# Patient Record
Sex: FEMALE | Race: WHITE | NOT HISPANIC OR LATINO | Employment: FULL TIME | URBAN - METROPOLITAN AREA
[De-identification: names, ages, dates, MRNs, and addresses within clinical notes are randomized per-mention and may not be internally consistent; named-entity substitution may affect disease eponyms.]

---

## 2024-06-26 ENCOUNTER — APPOINTMENT (OUTPATIENT)
Dept: CT IMAGING | Facility: CLINIC | Age: 29
End: 2024-06-26
Payer: COMMERCIAL

## 2024-06-26 ENCOUNTER — HOSPITAL ENCOUNTER (EMERGENCY)
Facility: CLINIC | Age: 29
Discharge: HOME | End: 2024-06-26
Attending: SOCIAL WORKER | Admitting: SOCIAL WORKER
Payer: COMMERCIAL

## 2024-06-26 VITALS
DIASTOLIC BLOOD PRESSURE: 84 MMHG | HEART RATE: 72 BPM | RESPIRATION RATE: 18 BRPM | OXYGEN SATURATION: 99 % | SYSTOLIC BLOOD PRESSURE: 123 MMHG | TEMPERATURE: 98.7 F

## 2024-06-26 DIAGNOSIS — K61.0 PERIANAL ABSCESS: ICD-10-CM

## 2024-06-26 DIAGNOSIS — K60.40 RECTAL FISTULA: ICD-10-CM

## 2024-06-26 LAB
ANION GAP SERPL CALCULATED.3IONS-SCNC: 11 MMOL/L (ref 7–15)
BASOPHILS # BLD AUTO: 0 10E3/UL (ref 0–0.2)
BASOPHILS NFR BLD AUTO: 0 %
BUN SERPL-MCNC: 9.2 MG/DL (ref 6–20)
CALCIUM SERPL-MCNC: 9 MG/DL (ref 8.6–10)
CHLORIDE SERPL-SCNC: 104 MMOL/L (ref 98–107)
CREAT SERPL-MCNC: 0.77 MG/DL (ref 0.51–0.95)
DEPRECATED HCO3 PLAS-SCNC: 23 MMOL/L (ref 22–29)
EGFRCR SERPLBLD CKD-EPI 2021: >90 ML/MIN/1.73M2
EOSINOPHIL # BLD AUTO: 0.1 10E3/UL (ref 0–0.7)
EOSINOPHIL NFR BLD AUTO: 1 %
ERYTHROCYTE [DISTWIDTH] IN BLOOD BY AUTOMATED COUNT: 13.8 % (ref 10–15)
GLUCOSE SERPL-MCNC: 84 MG/DL (ref 70–99)
HCG SERPL QL: NEGATIVE
HCT VFR BLD AUTO: 37.9 % (ref 35–47)
HGB BLD-MCNC: 12.6 G/DL (ref 11.7–15.7)
IMM GRANULOCYTES # BLD: 0 10E3/UL
IMM GRANULOCYTES NFR BLD: 0 %
LYMPHOCYTES # BLD AUTO: 2.8 10E3/UL (ref 0.8–5.3)
LYMPHOCYTES NFR BLD AUTO: 37 %
MCH RBC QN AUTO: 28.6 PG (ref 26.5–33)
MCHC RBC AUTO-ENTMCNC: 33.2 G/DL (ref 31.5–36.5)
MCV RBC AUTO: 86 FL (ref 78–100)
MONOCYTES # BLD AUTO: 0.5 10E3/UL (ref 0–1.3)
MONOCYTES NFR BLD AUTO: 6 %
NEUTROPHILS # BLD AUTO: 4.1 10E3/UL (ref 1.6–8.3)
NEUTROPHILS NFR BLD AUTO: 55 %
NRBC # BLD AUTO: 0 10E3/UL
NRBC BLD AUTO-RTO: 0 /100
PLATELET # BLD AUTO: 213 10E3/UL (ref 150–450)
POTASSIUM SERPL-SCNC: 4.1 MMOL/L (ref 3.4–5.3)
RBC # BLD AUTO: 4.4 10E6/UL (ref 3.8–5.2)
SODIUM SERPL-SCNC: 138 MMOL/L (ref 135–145)
WBC # BLD AUTO: 7.5 10E3/UL (ref 4–11)

## 2024-06-26 PROCEDURE — 99285 EMERGENCY DEPT VISIT HI MDM: CPT | Mod: 25

## 2024-06-26 PROCEDURE — 36415 COLL VENOUS BLD VENIPUNCTURE: CPT | Performed by: SOCIAL WORKER

## 2024-06-26 PROCEDURE — 80048 BASIC METABOLIC PNL TOTAL CA: CPT | Performed by: SOCIAL WORKER

## 2024-06-26 PROCEDURE — 84703 CHORIONIC GONADOTROPIN ASSAY: CPT | Performed by: SOCIAL WORKER

## 2024-06-26 PROCEDURE — 85025 COMPLETE CBC W/AUTO DIFF WBC: CPT | Performed by: SOCIAL WORKER

## 2024-06-26 PROCEDURE — 72193 CT PELVIS W/DYE: CPT

## 2024-06-26 PROCEDURE — 250N000011 HC RX IP 250 OP 636: Performed by: SOCIAL WORKER

## 2024-06-26 RX ORDER — IOPAMIDOL 755 MG/ML
500 INJECTION, SOLUTION INTRAVASCULAR ONCE
Status: COMPLETED | OUTPATIENT
Start: 2024-06-26 | End: 2024-06-26

## 2024-06-26 RX ADMIN — IOPAMIDOL 75 ML: 755 INJECTION, SOLUTION INTRAVENOUS at 19:06

## 2024-06-26 ASSESSMENT — COLUMBIA-SUICIDE SEVERITY RATING SCALE - C-SSRS
1. IN THE PAST MONTH, HAVE YOU WISHED YOU WERE DEAD OR WISHED YOU COULD GO TO SLEEP AND NOT WAKE UP?: NO
6. HAVE YOU EVER DONE ANYTHING, STARTED TO DO ANYTHING, OR PREPARED TO DO ANYTHING TO END YOUR LIFE?: NO
2. HAVE YOU ACTUALLY HAD ANY THOUGHTS OF KILLING YOURSELF IN THE PAST MONTH?: NO

## 2024-06-26 ASSESSMENT — ACTIVITIES OF DAILY LIVING (ADL)
ADLS_ACUITY_SCORE: 35

## 2024-06-26 NOTE — ED PROVIDER NOTES
Emergency Department Note      History of Present Illness     Chief Complaint   Wound Infection      HPI   Lit Maldonado is a 29 year old female who presents to the emergency department today for evaluation of abscess.  The patient has had a chronic wound on her left buttock which has been present for 1 year now.  Occasionally she has been lancing it herself. She was evaluated by general surgery and had this excised in May.  At that time they were unsure if it was a cyst versus hidradenitis. The cyst has since reformed multiple times since this excision.  Today the patient notes that the cyst was getting larger therefore she took an X-Acto knife and sterilized it with a lighter and lanced the cyst. More purulent drainage was expressed than normal therefore she presented to urgent care.  Denies fever, chills.  Reports her pain has improved since she lanced the cyst.  Denies history of ulcerative colitis or Crohn's disease.  Grandmother had ulcerative colitis.    Independent Historian   None    Review of External Notes   General surgery visit 5/2/2024: Excision of cyst performed, suspected hidradenitis.    Past Medical History     Medical History and Problem List   No past medical history on file.    Medications   CONCERTA OR  TRAZODONE HCL  ZOLOFT OR        Surgical History   No past surgical history on file.    Physical Exam     Patient Vitals for the past 24 hrs:   BP Temp Temp src Pulse Resp SpO2   06/26/24 2014 -- -- -- -- -- 100 %   06/26/24 2013 108/76 -- -- 81 -- 100 %   06/26/24 1918 111/70 -- -- 74 -- 100 %   06/26/24 1755 127/81 98.7  F (37.1  C) Oral 84 18 99 %     Physical Exam  General: Awake, alert, non-toxic.  Head:  Scalp is atraumatic.   Eyes:  Conjunctiva normal, PERRL  ENT:  The external nose and ears are normal.     Oropharynx clear, uvula midline.  Neck:  Normal range of motion without rigidity.  CV:  Regular rate and rhythm    No pathologic murmur, rubs, or gallops.  Resp:  Breath sounds are  clear bilaterally    Non-labored, no retractions or accessory muscle use  Abdomen: Abdomen is soft, no distension, no tenderness, no masses.     GOLDIE: Performed in the presence of a female chaperone.  Erythema and on the left medial aspect of the buttocks.  Scant purulence present. No significant induration.   MS:  No lower extremity edema/swelling. No midline cervical, thoracic, or lumbar tenderness.  Extremities without joint swelling or redness.  Skin:  Warm and dry, No rash or lesions noted.  Neuro:  Alert and oriented.  GCS 15 Moves all extremities normal.  No facial asymmetry. Gait normal.  Psych: Awake. Alert. Normal affect. Appropriate interactions.    Diagnostics     Lab Results   Labs Ordered and Resulted from Time of ED Arrival to Time of ED Departure   BASIC METABOLIC PANEL - Normal       Result Value    Sodium 138      Potassium 4.1      Chloride 104      Carbon Dioxide (CO2) 23      Anion Gap 11      Urea Nitrogen 9.2      Creatinine 0.77      GFR Estimate >90      Calcium 9.0      Glucose 84     HCG QUALITATIVE PREGNANCY - Normal    hCG Serum Qualitative Negative     CBC WITH PLATELETS AND DIFFERENTIAL    WBC Count 7.5      RBC Count 4.40      Hemoglobin 12.6      Hematocrit 37.9      MCV 86      MCH 28.6      MCHC 33.2      RDW 13.8      Platelet Count 213      % Neutrophils 55      % Lymphocytes 37      % Monocytes 6      % Eosinophils 1      % Basophils 0      % Immature Granulocytes 0      NRBCs per 100 WBC 0      Absolute Neutrophils 4.1      Absolute Lymphocytes 2.8      Absolute Monocytes 0.5      Absolute Eosinophils 0.1      Absolute Basophils 0.0      Absolute Immature Granulocytes 0.0      Absolute NRBCs 0.0         Imaging   CT Pelvis Soft Tissue w Contrast   Final Result   IMPRESSION:   1.  Left-sided transsphincteric perianal fistula with an associated 6 mm abscess versus phlegmon along the left side of the gluteal cleft.           Independent Interpretation   None    ED Course       Medications Administered   Medications   sodium chloride (PF) 0.9% PF flush 100 mL (65 mLs Intravenous $Given 6/26/24 1906)   iopamidol (ISOVUE-370) solution 500 mL (75 mLs Intravenous $Given 6/26/24 1906)       Procedures   Procedures     Discussion of Management   Colorectal surgery, Dr. Payne. She recommended sitz baths. Plan on outpatient colorectal surgery referral for further management of perianal fistula.     Social Determinants of Health adding to complexity of care   None    ED Course        Medical Decision Making / Diagnosis     CMS Diagnoses: None    MIPS       None    MDM   Lit Maldonado is a 29 year old female who presented to the ED for evaluation of perirectal abscess for 1 year. See HPI for further details. Differential diagnosis include but not limited to perianal abscess, rectal fistula, hidradenitis, abscess, among others. On exam the patient is afebrile and hemodynamically stable. Low clinical suspicion for sepsis or serious bacterial infection. There is erythema and scant purulent discharge on the medial aspect of the left buttocks. No induration or palpable area of fluid collection. Laboratory workup unremarkable, no leukocytosis.  CT scan revealed a transsphincteric perianal fistula with an associated 6 mm abscess versus phlegmon.  These findings were discussed with colorectal surgery who recommended outpatient follow-up for further management of perianal fistula, did not feel that antibiotics were needed at this time as it is currently draining. Sitz baths recommended. Discussed this with the patient and her mother over the phone.  They expressed understanding and were agreeable to the plan of outpatient colorectal surgery follow-up.  Return precautions discussed including worsening pain, fever, nausea and vomiting. Patient and mother expressed understanding. All questions were answered. Patient is discharged home in stable condition.     Disposition   The patient was discharged.      Diagnosis     ICD-10-CM    1. Perianal abscess  K61.0       2. Rectal fistula  K60.4 Adult Colorectal Surgery [facility] Referral         SHIV Pulido Alexandra, PA-C  06/26/24 2109

## 2024-06-26 NOTE — ED TRIAGE NOTES
Pt arrives ambulatory by self from urgent care for rectal abscess. Has had abscess for around 1 year, has surgery in May, but continues to drain and had it drained two hours ago at . Reports being sent over for possible surgical intervention and provider was worried it is connected to the rectum. Denies pain at the moment since it was drained but feels tenderness. Not on oral antibiotics. Takes concerta, paxil, and trazodone per pt. Has not eaten since 11am and was just a couple bites of apple.

## 2024-06-26 NOTE — ED PROVIDER NOTES
ED ATTENDING PHYSICIAN NOTE:   I evaluated this patient in conjunction with Christy Peguero PA-C  I have participated in the care of the patient and personally performed key elements of the history, exam, and medical decision making.      HPI:   Lit Maldonado is a 29 year old female who presents for evaluation of a wound.  Patient reports that she has had a rectal abscess for 1 year. She states that she underwent an excision around 2 months ago in May but immediately upon the completion of her post-op healing the abscess began filling again. She reports that twice since then she has attempted to use a tool at home to drain the abscess, most recently today she used an Xacto knife today to drain the area before presenting to Urgent Care with concerns about the volume of pus that drained from the wound. Patient denies experiencing any fever, nausea, vomiting, diarrhea, or urinary problems.      Independent Historian:   None    Review of External Notes:   I reviewed the office visit from earlier today, urgent care sent over to the ER  I reviewed the neurosurgery visit from 5/2/2024: For cyst at the left gluteal cleft and excision was performed in the office with packing placed     EXAM:   General: Overall stable and nontoxic appearing   HEENT: Conjunctivae clear, no scleral icterus, mucous membranes moist  Neuro: Alert, moving all extremities equally with intention  CV: Regular rate and rhythm, radial and DP pulses equal  Respiratory: No signs of respiratory distress, lungs clear to auscultation bilaterally   Abdomen: Soft, without rigidity or rebound throughout  Rectal exam performed with ED tech as chaperone: Drainage around the rectal area, no bleeding, area of fluctuance and tenderness at approximately 4:00 with a small draining tract  MSK: No lower extremity swelling or tenderness    Independent Interpretation (X-rays, CTs, rhythm strip):  None    Consultations/Discussion of Management or Tests:  CRC  surgery:  Dr Payne     Social Determinants of Health affecting care:   Stress/Adjustment Disorders     MEDICAL DECISION MAKING/ASSESSMENT AND PLAN:   29-year-old female with a history of recurrent perirectal abscess who presented to the emergency department with a wound concern after she self drained the abscess at home.  CT abdomen pelvis here demonstrated a left transsphincteric fistula with associated 6mm phlegmon vs abscess.  Area appears to be draining.  We discussed with colorectal surgery, who recommended deferring antibiotics or further incision and they will see patient in clinic shortly.  No systemic signs of infection.  Findings were discussed with patient and return precautions given.  Discharged in stable condition.     DIAGNOSIS:     ICD-10-CM    1. Perianal abscess  K61.0       2. Rectal fistula  K60.4 Adult Colorectal Surgery  Referral               DISPOSITION:   The patient was discharged.     Scribe Disclosure:  Annel GOTTLIEB, am serving as a scribe at 6:27 PM on 6/26/2024 to document services personally performed by Esthela Hernández MD based on my observations and the provider's statements to me.   6/26/2024  [facility] EMERGENCY DEPT       Esthela Hernández MD  06/27/24 4485

## 2024-06-27 ENCOUNTER — OFFICE VISIT (OUTPATIENT)
Dept: SURGERY | Facility: CLINIC | Age: 29
End: 2024-06-27
Payer: COMMERCIAL

## 2024-06-27 ENCOUNTER — TELEPHONE (OUTPATIENT)
Dept: SURGERY | Facility: CLINIC | Age: 29
End: 2024-06-27

## 2024-06-27 VITALS
OXYGEN SATURATION: 100 % | TEMPERATURE: 98.1 F | SYSTOLIC BLOOD PRESSURE: 109 MMHG | DIASTOLIC BLOOD PRESSURE: 77 MMHG | HEART RATE: 84 BPM | WEIGHT: 171.7 LBS

## 2024-06-27 DIAGNOSIS — K60.30 ANAL FISTULA: Primary | ICD-10-CM

## 2024-06-27 DIAGNOSIS — K61.0 PERIANAL ABSCESS: ICD-10-CM

## 2024-06-27 PROCEDURE — 99203 OFFICE O/P NEW LOW 30 MIN: CPT | Performed by: NURSE PRACTITIONER

## 2024-06-27 RX ORDER — CLINDAMYCIN PHOSPHATE 10 MG/G
GEL TOPICAL 2 TIMES DAILY
COMMUNITY
Start: 2023-12-14

## 2024-06-27 RX ORDER — PAROXETINE 30 MG/1
1 TABLET, FILM COATED ORAL EVERY EVENING
COMMUNITY
Start: 2023-12-14

## 2024-06-27 RX ORDER — OXYCODONE HYDROCHLORIDE 5 MG/1
1 TABLET ORAL EVERY 4 HOURS PRN
COMMUNITY
Start: 2024-05-02 | End: 2024-07-22

## 2024-06-27 RX ORDER — POLYETHYLENE GLYCOL 3350 17 G/17G
17 POWDER, FOR SOLUTION ORAL DAILY
COMMUNITY
Start: 2022-10-15

## 2024-06-27 ASSESSMENT — PAIN SCALES - GENERAL: PAINLEVEL: MODERATE PAIN (5)

## 2024-06-27 NOTE — TELEPHONE ENCOUNTER
"Patient presented to  then the ED yesterday for rectal pain.  \"Patient reports that she has had a rectal abscess for 1 year. She states that she underwent an excision around 2 months ago in May but immediately upon the completion of her post-op healing the abscess began filling again. She reports that twice since then she has attempted to use a tool at home to drain the abscess, most recently today she used an Xacto knife today to drain the area before presenting to Urgent Care with concerns about the volume of pus that drained from the wound. Patient denies experiencing any fever, nausea, vomiting, diarrhea, or urinary problem     CT 6/26/24  IMPRESSION:  1.  Left-sided transsphincteric perianal fistula with an associated 6 mm abscess versus phlegmon along the left side of the gluteal cleft.    Scheduled same day visit with Jennifer Mark NP due to amount of pus and pain. Patient said she has been self draining her abscess with a knife.     "

## 2024-06-27 NOTE — DISCHARGE INSTRUCTIONS
You were found to have an perirectal fistula today.  Please do sitz bath's 3 times a day.  Someone from colorectal surgery will call you to schedule an appointment.  Please return to the emergency department if you develop a fever, worsening pain, nausea, vomiting.

## 2024-06-27 NOTE — PROGRESS NOTES
Colon and Rectal Surgery Consult Clinic Note    Date: 2024     Referring provider:  No referring provider defined for this encounter.     RE: Lit Maldonado  : 1995  PEPE: 2024    Lit Maldonado is a very pleasant 29 year old female here for perianal abscess.    HPI:  A year ago thought she had cystic acne on her bottom. Had a sore that drained. This kept filling up and draining. Was treated with a topical cream. Had excisional debridement in May of this year of a left buttock cyst by a surgeon in [facility]. Wound was left open. Yesterday it was really swollen and had a lot of purulent drainage. Went to urgent care and was diagnosed with an anal fistula. She has drained it a few times by poking it with an X-Acto knife at home that she sterilizes with a flame. She denies any diarrhea but does get constipated with her anxiety medications.  Family history of grandmother with ulcerative colitis and colon cancer.    CT 24  IMPRESSION:  1.  Left-sided transsphincteric perianal fistula with an associated 6 mm abscess versus phlegmon along the left side of the gluteal cleft.    Physical Examination:  /77 (BP Location: Left arm, Patient Position: Sitting, Cuff Size: Adult Regular)   Pulse 84   Temp 98.1  F (36.7  C) (Oral)   Wt 171 lb 11.2 oz   LMP 2024 (Exact Date)   SpO2 100%   General: alert, oriented, in no acute distress, sitting comfortably  HEENT: mucous membranes moist    Perianal external examination: Exam was chaperoned by Marina Diego MA   External opening in the left anterior position with a drop of purulent drainage present. Tract of induration toward the anus that can be gently probed.     Digital rectal examination: Was performed.   Sphincter tone: Good.  Palpable lesions: No.  Other: None.  Bimanual examination: was not performed    Anoscopy: Was performed.   Hemorrhoids: No significant internal hemorrhoids.  Lesions: No obvious internal fistula  opening    Assessment/Plan: 29 year old female with recurrent perianal abscess for the past year with likely anal fistula. We had a long discussion regarding treatment including possible fistulotomy and possible seton placement depending on muscle involvement. CT shows a transsphincteric fistula but did discuss that imaging is now always completely accurate as to muscle involvement. Discussed that if a seton is placed that they could need further surgical intervention and if no tract is able to be found at the time of surgery that we may need to get further workup and imaging. Discussed the risks of developing an abscess and asked them to return to clinic if they develop any worsening symptoms. They stated an understanding of this and wishes to proceed with surgical intervention. Asked her not to drain it at home if it recurs but to come in to clinic and we will do our best to get her in within a day or two if needed. Patient's questions were answered to her stated satisfaction and she is in agreement with this plan.     Medical history:  No past medical history on file.    Surgical history:  No past surgical history on file.    Problem list:  There are no problems to display for this patient.      Medications:  Current Outpatient Medications   Medication Sig Dispense Refill    clindamycin (CLEOCIN-T) 1 % external gel Apply topically 2 times daily      CONCERTA OR None Entered      oxyCODONE (ROXICODONE) 5 MG tablet Take 1 tablet by mouth every 4 hours as needed      PARoxetine (PAXIL) 30 MG tablet Take 1 tablet by mouth daily      polyethylene glycol (MIRALAX) 17 g packet Take 17 g by mouth daily      TRAZODONE HCL None Entered      ZOLOFT OR None Entered         Allergies:  Allergies   Allergen Reactions    Sulfa Antibiotics Hives       Family history:  No family history on file.    Social history:  Social History     Tobacco Use    Smoking status: Passive Smoke Exposure - Never Smoker    Smokeless tobacco: Not on  file    Tobacco comments:     mom smokes   Substance Use Topics    Alcohol use: Not on file    Marital status: single.    Nursing Notes:   Marina Diego  6/27/2024  3:10 PM  Signed  Chief Complaint   Patient presents with    Abscess     Abscess/fistula complex       Vitals:    06/27/24 1509   BP: 109/77   BP Location: Left arm   Patient Position: Sitting   Cuff Size: Adult Regular   Pulse: 84   Temp: 98.1  F (36.7  C)   TempSrc: Oral   SpO2: 100%   Weight: 171 lb 11.2 oz       There is no height or weight on file to calculate BMI.    Marina Diego CMA       30 minutes spent on the date of encounter performing chart review, history and exam, documentation and further activities as noted above    SHAHLA Epps, NP-C  Colon and Rectal Surgery   [facility]    This note was created using speech recognition software and may contain unintended word substitutions.

## 2024-06-27 NOTE — TELEPHONE ENCOUNTER
[facility] Call Center    Phone Message    May a detailed message be left on voicemail: yes     Reason for Call: Other: Received call from Isaias, an RN with the [facility] in regards to a referral for the Pt for a Rectal Fistula. She states that the Pt was offered next appt in September and that the Pt will be septic by then due to presenting to the ER with a graciela-rectal abscess. HP TE being sent per abscess guidelines for review. Please call the Pt to discuss.     Action Taken: Message routed to:  [facility]    Travel Screening: Not Applicable     Date of Service:

## 2024-06-27 NOTE — LETTER
2024       RE: Lit Maldonado  [id number]  [place] [facility] [place] [id number]     Dear Colleague,    Thank you for referring your patient, Lit Maldonado, to the [facility] COLON AND RECTAL SURGERY CLINIC [place] at [facility]. Please see a copy of my visit note below.    Colon and Rectal Surgery Consult Clinic Note    Date: 2024     Referring provider:  No referring provider defined for this encounter.     RE: Lit Maldonado  : 1995  PEPE: 2024    Lit Maldonado is a very pleasant 29 year old female here for perianal abscess.    HPI:  A year ago thought she had cystic acne on her bottom. Had a sore that drained. This kept filling up and draining. Was treated with a topical cream. Had excisional debridement in May of this year of a left buttock cyst by a surgeon in [facility]. Wound was left open. Yesterday it was really swollen and had a lot of purulent drainage. Went to urgent care and was diagnosed with an anal fistula. She has drained it a few times by poking it with an X-Acto knife at home that she sterilizes with a flame. She denies any diarrhea but does get constipated with her anxiety medications.  Family history of grandmother with ulcerative colitis and colon cancer.    CT 24  IMPRESSION:  1.  Left-sided transsphincteric perianal fistula with an associated 6 mm abscess versus phlegmon along the left side of the gluteal cleft.    Physical Examination:  /77 (BP Location: Left arm, Patient Position: Sitting, Cuff Size: Adult Regular)   Pulse 84   Temp 98.1  F (36.7  C) (Oral)   Wt 171 lb 11.2 oz   LMP 2024 (Exact Date)   SpO2 100%   General: alert, oriented, in no acute distress, sitting comfortably  HEENT: mucous membranes moist    Perianal external examination: Exam was chaperoned by Marina Diego MA   External opening in the left anterior position with a drop of purulent drainage present.  Tract of induration toward the anus that can be gently probed.     Digital rectal examination: Was performed.   Sphincter tone: Good.  Palpable lesions: No.  Other: None.  Bimanual examination: was not performed    Anoscopy: Was performed.   Hemorrhoids: No significant internal hemorrhoids.  Lesions: No obvious internal fistula opening    Assessment/Plan: 29 year old female with recurrent perianal abscess for the past year with likely anal fistula. We had a long discussion regarding treatment including possible fistulotomy and possible seton placement depending on muscle involvement. CT shows a transsphincteric fistula but did discuss that imaging is now always completely accurate as to muscle involvement. Discussed that if a seton is placed that they could need further surgical intervention and if no tract is able to be found at the time of surgery that we may need to get further workup and imaging. Discussed the risks of developing an abscess and asked them to return to clinic if they develop any worsening symptoms. They stated an understanding of this and wishes to proceed with surgical intervention. Asked her not to drain it at home if it recurs but to come in to clinic and we will do our best to get her in within a day or two if needed. Patient's questions were answered to her stated satisfaction and she is in agreement with this plan.     Medical history:  No past medical history on file.    Surgical history:  No past surgical history on file.    Problem list:  There are no problems to display for this patient.      Medications:  Current Outpatient Medications   Medication Sig Dispense Refill    clindamycin (CLEOCIN-T) 1 % external gel Apply topically 2 times daily      CONCERTA OR None Entered      oxyCODONE (ROXICODONE) 5 MG tablet Take 1 tablet by mouth every 4 hours as needed      PARoxetine (PAXIL) 30 MG tablet Take 1 tablet by mouth daily      polyethylene glycol (MIRALAX) 17 g packet Take 17 g by mouth  daily      TRAZODONE HCL None Entered      ZOLOFT OR None Entered         Allergies:  Allergies   Allergen Reactions    Sulfa Antibiotics Hives       Family history:  No family history on file.    Social history:  Social History     Tobacco Use    Smoking status: Passive Smoke Exposure - Never Smoker    Smokeless tobacco: Not on file    Tobacco comments:     mom smokes   Substance Use Topics    Alcohol use: Not on file    Marital status: single.    Nursing Notes:   Marina Diego  6/27/2024  3:10 PM  Signed  Chief Complaint   Patient presents with    Abscess     Abscess/fistula complex       Vitals:    06/27/24 1509   BP: 109/77   BP Location: Left arm   Patient Position: Sitting   Cuff Size: Adult Regular   Pulse: 84   Temp: 98.1  F (36.7  C)   TempSrc: Oral   SpO2: 100%   Weight: 171 lb 11.2 oz       There is no height or weight on file to calculate BMI.    Marina Diego CMA       30 minutes spent on the date of encounter performing chart review, history and exam, documentation and further activities as noted above      This note was created using speech recognition software and may contain unintended word substitutions.        Again, thank you for allowing me to participate in the care of your patient.      Sincerely,    SHAHLA Boyd CNP

## 2024-06-27 NOTE — NURSING NOTE
Chief Complaint   Patient presents with    Abscess     Abscess/fistula complex       Vitals:    06/27/24 1509   BP: 109/77   BP Location: Left arm   Patient Position: Sitting   Cuff Size: Adult Regular   Pulse: 84   Temp: 98.1  F (36.7  C)   TempSrc: Oral   SpO2: 100%   Weight: 171 lb 11.2 oz       There is no height or weight on file to calculate BMI.    Marina Diego, CMA

## 2024-06-28 ENCOUNTER — TELEPHONE (OUTPATIENT)
Dept: SURGERY | Facility: CLINIC | Age: 29
End: 2024-06-28
Payer: COMMERCIAL

## 2024-06-28 NOTE — TELEPHONE ENCOUNTER
Left voicemail for patient regarding scheduling outpatient surgery with one of the Colon and Rectal surgeons.    Provided contact number to discuss.    P: [phone]    __    hCristiane Beasley  Ursula-op Coordinator  [place]-Rect[facility]  Direct Phone: [phone]

## 2024-07-03 NOTE — TELEPHONE ENCOUNTER
Left voicemail for patient regarding scheduling outpatient surgery with one of the Colon and Rectal surgeons.    Provided contact number to discuss.    P: [phone]    __    Christiane Beasley  Ursula-op Coordinator  [place]-Rect[facility]  Direct Phone: [phone]

## 2024-07-03 NOTE — TELEPHONE ENCOUNTER
Vm msg received from patient returning a call to schedule surgery.     Christiane Beasley  Ursula-op Coordinator  [place]-Rect[facility]  Direct Phone: [phone]

## 2024-07-17 PROBLEM — K60.30 ANAL FISTULA: Status: ACTIVE | Noted: 2024-06-27

## 2024-07-17 NOTE — TELEPHONE ENCOUNTER
Patient called back to schedule with one of the colorectal surgeons.     Patient states that she is scheduled for August 6th in the clinic. Informed patient that she has nothing scheduled with us at all in the clinic or in the OR. Patient was offered to schedule procedure.     Patient requesting ASAP     Date of Surgery: 7/24/2024 at [place], pt agreed     Approximate arrival time given:  told patient writer would call OR and call back to confirm time. 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 Patient wanting to complete the same day as surgery. Informed patient that this has to be done prior to surgery     WOC: No     Labs: No     Post-Op Appt Date w/ NP/PA: 2-3 week post op needs to be scheduled      Post-Op Appt Date w/ Surgeon: n/a      Bowel Prep:  Yes  2 Fleets Sent via Letter Tab    Discussed with patient pre-op RN will call 2-3 days prior to surgery with arrival time and instructions:  Yes     Standard Surgery Packet Sent: Yes 07/17/24 via Mail - Standard    Additional Comments: will contact patient back to confirm time       Dena Philippe on 7/17/2024 at 11:03 AM

## 2024-07-17 NOTE — TELEPHONE ENCOUNTER
Left message regarding scheduling surgery/procedure with Dr. Deleon. Writer left call back number on the patients voicemail.      Dena Paez on 7/17/2024 at 10:45 AM   P: [phone]

## 2024-07-17 NOTE — TELEPHONE ENCOUNTER
FUTURE VISIT INFORMATION      SURGERY INFORMATION:  Date: 7/24/24  [facility]  Surgeon:  Jessica Cheng MD   Anesthesia Type:  mac  Procedure: PLACEMENT, SETON STITCH, EXAMINATION UNDER ANESTHESIA, POSSIBLE FISTULOTOMY     RECORDS REQUESTED FROM:       Primary Care Provider: Nneka Dias MD  - [facility]

## 2024-07-17 NOTE — TELEPHONE ENCOUNTER
Called patient back that per [facility] arrival time is 630 a.m. on 7/24/2024. Pt aware that pre-op RN from [facility] will be calling.    PAC appointment confirmed 7/22/2024 at 715 a.m. 2-3 week post-op scheduled 8/6/2024 with Jennifer. Pt states she is leaving on 8/9/2024.      Dena Paez on 7/17/2024 at 12:13 PM

## 2024-07-19 NOTE — TELEPHONE ENCOUNTER
Patient called in to confirm 7/22 PAC appt. Writer called back and let them the  appt time / arrival time and address for PAC appt Kika Holloway on 7/19/2024 at 12:45 PM

## 2024-07-22 ENCOUNTER — OFFICE VISIT (OUTPATIENT)
Dept: SURGERY | Facility: CLINIC | Age: 29
End: 2024-07-22
Payer: COMMERCIAL

## 2024-07-22 ENCOUNTER — PRE VISIT (OUTPATIENT)
Dept: SURGERY | Facility: CLINIC | Age: 29
End: 2024-07-22

## 2024-07-22 VITALS
HEIGHT: 68 IN | OXYGEN SATURATION: 97 % | HEART RATE: 82 BPM | WEIGHT: 175 LBS | SYSTOLIC BLOOD PRESSURE: 104 MMHG | DIASTOLIC BLOOD PRESSURE: 70 MMHG | TEMPERATURE: 98 F | BODY MASS INDEX: 26.52 KG/M2

## 2024-07-22 DIAGNOSIS — K60.30 ANAL FISTULA: ICD-10-CM

## 2024-07-22 DIAGNOSIS — Z01.818 PREOP EXAMINATION: Primary | ICD-10-CM

## 2024-07-22 PROCEDURE — 99203 OFFICE O/P NEW LOW 30 MIN: CPT | Performed by: PHYSICIAN ASSISTANT

## 2024-07-22 RX ORDER — IBUPROFEN 200 MG
200 TABLET ORAL EVERY 4 HOURS PRN
COMMUNITY

## 2024-07-22 ASSESSMENT — PAIN SCALES - GENERAL: PAINLEVEL: NO PAIN (0)

## 2024-07-22 ASSESSMENT — LIFESTYLE VARIABLES: TOBACCO_USE: 0

## 2024-07-22 ASSESSMENT — ENCOUNTER SYMPTOMS: SEIZURES: 1

## 2024-07-22 NOTE — PATIENT INSTRUCTIONS
Name:  Lit Maldonado   MRN:  [id number]   :  1995   Today's Date:  2024         You were seen today for a pre-operative assessment in the:    Pre-operative Anesthesia Assessment Center(PAC)  [facility]  [address] [place] [id number]  phone [phone]      You will be receiving a call with location, date, arrival time and diet instructions from Preadmission Nursing at your surgical site:    -[facility]: [phone]        Anesthesia recommendations for medications:    Hold Aspirin for 7 days before procedure.  Hold Multivitamins for 7 days before procedure.   Hold Herbal medications and Supplements for 7 days before procedure.  Hold Ibuprofen for 1 day before procedure.   Hold Naproxen for 4 days before procedure.     No alcohol or cannabis products for 24 hours before your procedure       Please DO NOT take the following medications the day of procedure:  Miralax.   Concerta  Clindamycin gel    Please take these medications the day of procedure:  Paroxetine (Paxil)        How do I prepare myself?  - Please take 2 showers (one the night prior to surgery and one the morning of surgery) using Scrubcare or Hibiclens soap.    Use this soap only from the neck to your toes.     Leave the soap on your skin for one minute--then rinse thoroughly.      You may use your own shampoo and conditioner. No other hair products.   - Please remove all jewelry and body piercings.  - No lotions, deodorants or fragrance.  - No makeup or fingernail polish.   - Bring your ID and insurance card.    -If you have a Deep Brain Stimulator, a Spinal Cord Stimulator, or any implanted Neuro Device, you must bring the remote to your appointment                 For further questions regarding your surgery please call your surgeon's office.

## 2024-07-22 NOTE — H&P
Pre-Operative H & P     CC:  Preoperative exam to assess for increased cardiopulmonary risk while undergoing surgery and anesthesia.    Date of Encounter: 7/22/2024  Primary Care Physician:  Nneka Dias MD     Reason for visit:   Encounter Diagnoses   Name Primary?    Preop examination Yes    Anal fistula        HPI  Lit Maldonado is a 29 year old female who presents for pre-operative H & P in preparation for  Procedure Information       Case: [id number] Date: 07/24/24    Procedure: PLACEMENT, SETON STITCH, EXAMINATION UNDER ANESTHESIA, POSSIBLE FISTULOTOMY (Anus)    Anesthesia type: MAC    Diagnosis: Anal fistula [K60.3]    Pre-op diagnosis: Anal fistula [K60.3]    Location: [facility]    Providers: Jessica Cheng MD            Ms. Maldonado has a past medical history significant for anxiety and ADHD.  She was recently seen by colon and rectal nurse practitioner for perianal abscess.  This was debrided in May surgically at [facility].  It swells and she is able to drain it at home.  CT in June showed a left-sided transsphincteric perianal fistula with abscess.  Treatment options were discussed and the above surgery is now planned.    History is obtained from the patient and chart review    Hx of abnormal bleeding or anti-platelet use: Denies    Menstrual history: Patient's last menstrual period was 07/04/2024 (approximate).:      Past Medical History  Past Medical History:   Diagnosis Date    ADHD (attention deficit hyperactivity disorder)     Anxiety        Past Surgical History  Past Surgical History:   Procedure Laterality Date    MYRINGOTOMY W/ TUBES      As a child       Prior to Admission Medications  Current Outpatient Medications   Medication Sig Dispense Refill    CONCERTA OR Take by mouth every morning      ibuprofen (ADVIL/MOTRIN) 200 MG tablet Take 200 mg by mouth every 4 hours as needed for pain      PARoxetine (PAXIL) 30 MG tablet Take  1 tablet by mouth every evening      polyethylene glycol (MIRALAX) 17 g packet Take 17 g by mouth daily      TRAZODONE HCL every evening      clindamycin (CLEOCIN-T) 1 % external gel Apply topically 2 times daily         Allergies  Allergies   Allergen Reactions    Sulfa Antibiotics Hives       Social History  Social History     Socioeconomic History    Marital status: Single     Spouse name: Not on file    Number of children: Not on file    Years of education: Not on file    Highest education level: Not on file   Occupational History    Not on file   Tobacco Use    Smoking status: Never     Passive exposure: Yes    Smokeless tobacco: Never    Tobacco comments:     mom smokes   Substance and Sexual Activity    Alcohol use: Never    Drug use: Never    Sexual activity: Not on file   Other Topics Concern    Not on file   Social History Narrative    Not on file     Social Determinants of Health     Financial Resource Strain: Not At Risk (12/14/2023)    Received from [facility]    Financial Resource Strain     Is it hard for you to pay for the very basics like food, housing, medical care or heating?: No   Food Insecurity: Not At Risk (12/14/2023)    Received from [facility]    Food Insecurity     Does your food run out before you have the money to buy more?: No   Transportation Needs: Not At Risk (12/14/2023)    Received from [facility]    Transportation Needs     Does a lack of transportation keep you from your medical appointments or from getting your medications?: No   Physical Activity: Not on file   Stress: Not on file   Social Connections: Not on file   Interpersonal Safety: Not on file   Housing Stability: Not on file       Family History  Family History   Problem Relation Age of Onset    Anesthesia Reaction No family hx of     Venous thrombosis No family hx of        Review of Systems  The complete review of systems is negative other than noted in the HPI or here.     Anesthesia Evaluation   Pt has  "had prior anesthetic.     No history of anesthetic complications       ROS/MED HX  ENT/Pulmonary:    (-) tobacco use, asthma and sleep apnea   Neurologic: Comment: ADHD    (+)       seizures,  features: febrile seizures as a young child,                    (-) no CVA   Cardiovascular:  - neg cardiovascular ROS     METS/Exercise Tolerance: >4 METS    Hematologic:  - neg hematologic  ROS  (-) history of blood clots and history of blood transfusion   Musculoskeletal:  - neg musculoskeletal ROS     GI/Hepatic:  - neg GI/hepatic ROS     Renal/Genitourinary:  - neg Renal ROS     Endo:  - neg endo ROS     Psychiatric/Substance Use:     (+) psychiatric history anxiety       Infectious Disease:  - neg infectious disease ROS     Malignancy:  - neg malignancy ROS     Other:  - neg other ROS          /70 (BP Location: Right arm, Patient Position: Sitting, Cuff Size: Adult Regular)   Pulse 82   Temp 98  F (36.7  C) (Oral)   Ht 1.727 m (5' 8\")   Wt 79.4 kg (175 lb)   LMP 07/04/2024 (Approximate)   SpO2 97%   Breastfeeding No   BMI 26.61 kg/m      Physical Exam   Constitutional: Awake, alert, cooperative, no apparent distress, and appears stated age.  Eyes: Pupils equal, round and reactive to light, extra ocular muscles intact, sclera clear, conjunctiva normal.  HENT: Normocephalic, oral pharynx with moist mucus membranes, good dentition. No goiter appreciated.   Respiratory: Clear to auscultation bilaterally, no crackles or wheezing.  Cardiovascular: Regular rate and rhythm, normal S1 and S2, and no murmur noted.  No edema. Palpable pulses to radial and PT arteries.   GI: Not assessed  Lymph/Hematologic: No cervical lymphadenopathy and no supraclavicular lymphadenopathy.  Genitourinary:  deferred  Skin: Warm and dry.    Musculoskeletal: Full ROM of neck. There is no redness, warmth, or swelling of the joints. Gross motor strength is normal.    Neurologic: Awake, alert, oriented to name, place and time. Cranial " nerves II-XII are grossly intact. Gait is normal.   Neuropsychiatric: Calm, cooperative. Normal affect.     Prior Labs/Diagnostic Studies   All labs and imaging personally reviewed     Component      Latest Ref Rng 6/26/2024  6:23 PM   Sodium      135 - 145 mmol/L 138    Potassium      3.4 - 5.3 mmol/L 4.1    Chloride      98 - 107 mmol/L 104    Carbon Dioxide (CO2)      22 - 29 mmol/L 23    Anion Gap      7 - 15 mmol/L 11    Urea Nitrogen      6.0 - 20.0 mg/dL 9.2    Creatinine      0.51 - 0.95 mg/dL 0.77    GFR Estimate      >60 mL/min/1.73m2 >90    Calcium      8.6 - 10.0 mg/dL 9.0    Glucose      70 - 99 mg/dL 84        Component      Latest Ref Rng 6/26/2024  6:23 PM   WBC      4.0 - 11.0 10e3/uL 7.5    RBC Count      3.80 - 5.20 10e6/uL 4.40    Hemoglobin      11.7 - 15.7 g/dL 12.6    Hematocrit      35.0 - 47.0 % 37.9    MCV      78 - 100 fL 86    MCH      26.5 - 33.0 pg 28.6    MCHC      31.5 - 36.5 g/dL 33.2    RDW      10.0 - 15.0 % 13.8    Platelet Count      150 - 450 10e3/uL 213    % Neutrophils      % 55    % Lymphocytes      % 37    % Monocytes      % 6    % Eosinophils      % 1    % Basophils      % 0    % Immature Granulocytes      % 0    NRBCs per 100 WBC      <1 /100 0    Absolute Neutrophils      1.6 - 8.3 10e3/uL 4.1    Absolute Lymphocytes      0.8 - 5.3 10e3/uL 2.8    Absolute Monocytes      0.0 - 1.3 10e3/uL 0.5    Absolute Eosinophils      0.0 - 0.7 10e3/uL 0.1    Absolute Basophils      0.0 - 0.2 10e3/uL 0.0    Absolute Immature Granulocytes      <=0.4 10e3/uL 0.0    Absolute NRBCs      10e3/uL 0.0          EKG/ stress test - if available please see in ROS above     EXAM: CT PELVIS SOFT TISSUE W CONTRAST  LOCATION: [facility]  DATE: 6/26/2024  IMPRESSION:  1.  Left-sided transsphincteric perianal fistula with an associated 6 mm abscess versus phlegmon along the left side of the gluteal cleft.       The patient's records and results personally reviewed by this  "provider.     Outside records reviewed from: Care Everywhere        Assessment    Lit Maldonado is a 29 year old female seen as a PAC referral for risk assessment and optimization for anesthesia.    Plan/Recommendations  Pt will be optimized for the proposed procedure.  See below for details on the assessment, risk, and preoperative recommendations    NEUROLOGY  - No history of TIA, CVA or seizure  - hold Concerta DOS  - Post Op delirium risk factors:  No risk identified    ENT  - No current airway concerns.  Will need to be reassessed day of surgery.  Mallampati: I  TM: > 3    CARDIAC  - Denies cardiac history  - METS (Metabolic Equivalents)  Patient performs 4 or more METS exercise without symptoms             Total Score: 0      RCRI-Very low risk: Class 1 0.4% complication rate             Total Score: 0        PULMONARY    FLACA Low Risk             Total Score: 0      - Denies asthma or inhaler use  - Tobacco History    History   Smoking Status    Never   Smokeless Tobacco    Never       GI  - Denies GERD  PONV Medium Risk  Total Score: 2           1 AN PONV: Pt is Female    1 AN PONV: Patient is not a current smoker        /RENAL  - Baseline Creatinine 0.77    ENDOCRINE    - BMI: Estimated body mass index is 26.61 kg/m  as calculated from the following:    Height as of this encounter: 1.727 m (5' 8\").    Weight as of this encounter: 79.4 kg (175 lb).  Overweight (BMI 25.0-29.9)  - No history of Diabetes Mellitus    HEME  VTE Low Risk 0.26%             Total Score: 0      - No history of abnormal bleeding or antiplatelet use.        PSYCH  -Anxiety, continue mental health medications    Different anesthesia methods/types have been discussed with the patient, but they are aware that the final plan will be decided by the assigned anesthesia provider on the date of service.      The patient is optimized for their procedure. AVS with information on surgery time/arrival time, meds and NPO status given by nursing " staff. No further diagnostic testing indicated.      On the day of service:     Prep time: 9 minutes  Visit time: 9 minutes  Documentation time: 12 minutes  ------------------------------------------  Total time: 30 minutes      Ellie Benitez PA-C  Preoperative Assessment Center  [facility]  Clinic and Surgery Center  Phone: [phone]  Fax: [phone]

## 2024-07-23 ENCOUNTER — TELEPHONE (OUTPATIENT)
Dept: SURGERY | Facility: CLINIC | Age: 29
End: 2024-07-23
Payer: COMMERCIAL

## 2024-07-23 NOTE — TELEPHONE ENCOUNTER
[facility] Call Center    Phone Message    May a detailed message be left on voicemail: yes     Reason for Call: Other: Patient says she missed call from nurse. Please call patient back.      Action Taken: Message routed to:  [facility] ([facility]    Travel Screening: Not Applicable     Date of Service:

## 2024-07-23 NOTE — TELEPHONE ENCOUNTER
[facility] Call [facility]    Phone Message    May a detailed message be left on voicemail: yes     Reason for Call: Other: Patient calling with questions about prep needed for procedure scheduled for 07/24. Please call patient back to advise.      Action Taken: Message routed to:  [facility]    Travel Screening: Not Applicable     Date of Service:

## 2024-07-24 ENCOUNTER — TRANSFERRED RECORDS (OUTPATIENT)
Dept: HEALTH INFORMATION MANAGEMENT | Facility: CLINIC | Age: 29
End: 2024-07-24
Payer: COMMERCIAL

## 2024-08-06 ENCOUNTER — OFFICE VISIT (OUTPATIENT)
Dept: SURGERY | Facility: CLINIC | Age: 29
End: 2024-08-06
Payer: COMMERCIAL

## 2024-08-06 VITALS
WEIGHT: 173.9 LBS | SYSTOLIC BLOOD PRESSURE: 105 MMHG | OXYGEN SATURATION: 98 % | HEART RATE: 83 BPM | HEIGHT: 68 IN | DIASTOLIC BLOOD PRESSURE: 75 MMHG | BODY MASS INDEX: 26.36 KG/M2

## 2024-08-06 DIAGNOSIS — K60.30 ANAL FISTULA: ICD-10-CM

## 2024-08-06 DIAGNOSIS — Z09 FOLLOW-UP EXAMINATION AFTER COLORECTAL SURGERY: Primary | ICD-10-CM

## 2024-08-06 PROCEDURE — 99024 POSTOP FOLLOW-UP VISIT: CPT | Performed by: NURSE PRACTITIONER

## 2024-08-06 ASSESSMENT — PAIN SCALES - GENERAL: PAINLEVEL: NO PAIN (0)

## 2024-08-06 NOTE — LETTER
"2024       RE: Lit Maldonado  [address]  [place] [address]       Dear Colleague,    Thank you for referring your patient, Lit Maldonado, to the [facility] COLON AND RECTAL SURGERY CLINIC [place] at [facility]. Please see a copy of my visit note below.    Colon and Rectal Surgery Postoperative Clinic Note    RE: Lit Maldonado  : 1995  PEPE: 2024    Lit Maldonado is a very pleasant 29 year old female with transsphincteric fistula who is now status post EUA fistulotomy on 24 by Dr. Cheng.    Interval history: Lit has been doing well. No significant pain. Minimal drainage. No difficulty with bowel movements. No fevers or chills.    Physical Examination: Exam was chaperoned by CLEMENCIA Lay   /75 (BP Location: Left arm, Patient Position: Sitting, Cuff Size: Adult Regular)   Pulse 83   Ht 5' 8\"   Wt 173 lb 14.4 oz   LMP 2024 (Approximate)   SpO2 98%   BMI 26.44 kg/m    General: alert, oriented, in no acute distress, sitting comfortably  HEENT: mucous membranes moist    Perianal external examination:  Surgical site in the left anterior position with good granulation tissue in the base of the wound. Partially dissolved sutures in place.    Digital rectal examination: Was deferred.    Anoscopy: Was deferred.    Assessment/Plan:  29 year old female status post EUA fistulotomy on 24 by Dr. Cheng. No signs of infection. She is healing well. Continue to keep stools soft. Sitz baths several times a day and avoid lakes and pools. Follow up in 3-4 weeks for wound check or anytime in the meantime with any questions or concerns. Patient's questions were answered to her stated satisfaction and she is in agreement with this plan.     Medical history:  Past Medical History:   Diagnosis Date    ADHD (attention deficit hyperactivity disorder)     Anxiety        Surgical history:  Past Surgical " "History:   Procedure Laterality Date    MYRINGOTOMY W/ TUBES      As a child     Problem list:  Patient Active Problem List    Diagnosis Date Noted    Anal fistula 06/27/2024     Priority: Medium     Medications:  Current Outpatient Medications   Medication Sig Dispense Refill    CONCERTA OR Take by mouth every morning      PARoxetine (PAXIL) 30 MG tablet Take 1 tablet by mouth every evening      polyethylene glycol (MIRALAX) 17 g packet Take 17 g by mouth daily      TRAZODONE HCL every evening      clindamycin (CLEOCIN-T) 1 % external gel Apply topically 2 times daily (Patient not taking: Reported on 8/6/2024)      ibuprofen (ADVIL/MOTRIN) 200 MG tablet Take 200 mg by mouth every 4 hours as needed for pain (Patient not taking: Reported on 8/6/2024)       Allergies:  Allergies   Allergen Reactions    Sulfa Antibiotics Hives     Family history:  Family History   Problem Relation Age of Onset    Anesthesia Reaction No family hx of     Venous thrombosis No family hx of      Social history:  Social History     Tobacco Use    Smoking status: Never     Passive exposure: Yes    Smokeless tobacco: Never    Tobacco comments:     mom smokes   Substance Use Topics    Alcohol use: Never     Marital status: single.  Nursing Notes:   Neeru Fierro  8/6/2024  8:59 AM  Signed  Chief Complaint   Patient presents with    Post-op Visit     Vitals:    08/06/24 0855   BP: 105/75   BP Location: Left arm   Patient Position: Sitting   Cuff Size: Adult Regular   Pulse: 83   SpO2: 98%   Weight: 173 lb 14.4 oz   Height: 5' 8\"     Body mass index is 26.44 kg/m .    CLEMENCIA Lay     15 minutes spent on the date of the encounter doing chart review, history and exam, documentation and further activities as noted above.   This is a postop visit.    This note was created using speech recognition software and may contain unintended word substitutions.      Again, thank you for allowing me to participate in the care of your patient.  "     Sincerely,    SHAHLA Boyd CNP

## 2024-08-06 NOTE — PROGRESS NOTES
"Colon and Rectal Surgery Postoperative Clinic Note    RE: Lit Maldonado  : 1995  PEPE: 2024    iLt Maldonado is a very pleasant 29 year old female with transsphincteric fistula who is now status post EUA fistulotomy on 24 by Dr. Cheng.    Interval history: Lit has been doing well. No significant pain. Minimal drainage. No difficulty with bowel movements. No fevers or chills.    Physical Examination: Exam was chaperoned by Neeru Fierro EMT   /75 (BP Location: Left arm, Patient Position: Sitting, Cuff Size: Adult Regular)   Pulse 83   Ht 5' 8\"   Wt 173 lb 14.4 oz   LMP 2024 (Approximate)   SpO2 98%   BMI 26.44 kg/m    General: alert, oriented, in no acute distress, sitting comfortably  HEENT: mucous membranes moist    Perianal external examination:  Surgical site in the left anterior position with good granulation tissue in the base of the wound. Partially dissolved sutures in place.    Digital rectal examination: Was deferred.    Anoscopy: Was deferred.    Assessment/Plan:  29 year old female status post EUA fistulotomy on 24 by Dr. Cheng. No signs of infection. She is healing well. Continue to keep stools soft. Sitz baths several times a day and avoid lakes and pools. Follow up in 3-4 weeks for wound check or anytime in the meantime with any questions or concerns. Patient's questions were answered to her stated satisfaction and she is in agreement with this plan.     Medical history:  Past Medical History:   Diagnosis Date    ADHD (attention deficit hyperactivity disorder)     Anxiety        Surgical history:  Past Surgical History:   Procedure Laterality Date    MYRINGOTOMY W/ TUBES      As a child       Problem list:    Patient Active Problem List    Diagnosis Date Noted    Anal fistula 2024     Priority: Medium       Medications:  Current Outpatient Medications   Medication Sig Dispense Refill    CONCERTA OR Take by mouth every morning      PARoxetine (PAXIL) " "30 MG tablet Take 1 tablet by mouth every evening      polyethylene glycol (MIRALAX) 17 g packet Take 17 g by mouth daily      TRAZODONE HCL every evening      clindamycin (CLEOCIN-T) 1 % external gel Apply topically 2 times daily (Patient not taking: Reported on 8/6/2024)      ibuprofen (ADVIL/MOTRIN) 200 MG tablet Take 200 mg by mouth every 4 hours as needed for pain (Patient not taking: Reported on 8/6/2024)         Allergies:  Allergies   Allergen Reactions    Sulfa Antibiotics Hives       Family history:  Family History   Problem Relation Age of Onset    Anesthesia Reaction No family hx of     Venous thrombosis No family hx of        Social history:  Social History     Tobacco Use    Smoking status: Never     Passive exposure: Yes    Smokeless tobacco: Never    Tobacco comments:     mom smokes   Substance Use Topics    Alcohol use: Never     Marital status: single.  Nursing Notes:   Neeru Fierro  8/6/2024  8:59 AM  Signed  Chief Complaint   Patient presents with    Post-op Visit       Vitals:    08/06/24 0855   BP: 105/75   BP Location: Left arm   Patient Position: Sitting   Cuff Size: Adult Regular   Pulse: 83   SpO2: 98%   Weight: 173 lb 14.4 oz   Height: 5' 8\"       Body mass index is 26.44 kg/m .    CLEMENCIA Lay     15 minutes spent on the date of the encounter doing chart review, history and exam, documentation and further activities as noted above.   This is a postop visit.    SHAHLA Epps, NP-C  Colon and Rectal Surgery  [facility]    This note was created using speech recognition software and may contain unintended word substitutions.    "

## 2024-08-06 NOTE — NURSING NOTE
"Chief Complaint   Patient presents with    Post-op Visit       Vitals:    08/06/24 0855   BP: 105/75   BP Location: Left arm   Patient Position: Sitting   Cuff Size: Adult Regular   Pulse: 83   SpO2: 98%   Weight: 173 lb 14.4 oz   Height: 5' 8\"       Body mass index is 26.44 kg/m .    Neeru Fierro, EMT  "

## 2024-09-23 ENCOUNTER — TELEPHONE (OUTPATIENT)
Dept: SURGERY | Facility: CLINIC | Age: 29
End: 2024-09-23
Payer: COMMERCIAL

## 2024-09-23 NOTE — TELEPHONE ENCOUNTER
Patient confirmed scheduled appointment:  Date: 09/24/2024  Time: 230 pm   Visit type: Post OP   Provider: Jennifer Jackson NP  Location: [facility]  Testing/imaging: n/a  Additional notes: Pt r/s from 09/03/2024

## 2024-09-23 NOTE — TELEPHONE ENCOUNTER
Left Voicemail (1st Attempt) for the patient to call back and schedule the following:    Appointment type: Post Op   Provider: chris Jackson PA-C  Return date: 09/03/2024  Specialty phone number: [phone]  Additional appointment(s) needed: n/a  Additonal Notes: Pt was a no show for 9/3/24 appt, pt needs to reschedule

## 2024-09-24 ENCOUNTER — OFFICE VISIT (OUTPATIENT)
Dept: SURGERY | Facility: CLINIC | Age: 29
End: 2024-09-24
Payer: COMMERCIAL

## 2024-09-24 VITALS
HEART RATE: 100 BPM | BODY MASS INDEX: 26.66 KG/M2 | SYSTOLIC BLOOD PRESSURE: 107 MMHG | OXYGEN SATURATION: 99 % | DIASTOLIC BLOOD PRESSURE: 77 MMHG | HEIGHT: 68 IN | WEIGHT: 175.9 LBS

## 2024-09-24 DIAGNOSIS — K60.30 ANAL FISTULA: ICD-10-CM

## 2024-09-24 DIAGNOSIS — Z09 FOLLOW-UP EXAMINATION AFTER COLORECTAL SURGERY: Primary | ICD-10-CM

## 2024-09-24 ASSESSMENT — PAIN SCALES - GENERAL: PAINLEVEL: NO PAIN (0)

## 2024-09-24 NOTE — NURSING NOTE
"Chief Complaint   Patient presents with    Post-op Visit     fistulotomy       Vitals:    09/24/24 1448   BP: 107/77   BP Location: Left arm   Patient Position: Sitting   Cuff Size: Adult Regular   Pulse: 100   SpO2: 99%   Weight: 175 lb 14.4 oz   Height: 5' 8\"       Body mass index is 26.75 kg/m .    Neeru Fierro, EMT  "

## 2024-09-24 NOTE — LETTER
"2024       RE: Lit Maldonado  [id number]  [facility] [place] [id number]     Dear Colleague,    Thank you for referring your patient, Lit Maldonado, to the [facility] [place] [facility]. Please see a copy of my visit note below.    Colon and Rectal Surgery Postoperative Clinic Note    RE: Lit Maldonado  : 1995  PEPE: 2024    Lit Maldonado is a very pleasant 29 year old female with transsphincteric fistula who is now status post EUA fistulotomy on 24 by Dr. Cheng.    Interval history: Lit has been doing well. No pain. No drainage. No difficulty with bowel movements. No fevers or chills.    Physical Examination: Exam was chaperoned by Neeru Fierro, EMT   /77 (BP Location: Left arm, Patient Position: Sitting, Cuff Size: Adult Regular)   Pulse 100   Ht 5' 8\"   Wt 175 lb 14.4 oz   SpO2 99%   BMI 26.75 kg/m    General: alert, oriented, in no acute distress, sitting comfortably  HEENT: mucous membranes moist    Perianal external examination:  Surgical site in the left anterior position completely healed    Digital rectal examination: Was deferred.    Anoscopy: Was deferred.    Assessment/Plan:  29 year old female status post EUA fistulotomy on 24 by Dr. Cheng. Surgical site healed. No difficulty with bowel movements. She can follow up as needed.    Medical history:  Past Medical History:   Diagnosis Date     ADHD (attention deficit hyperactivity disorder)      Anxiety        Surgical history:  Past Surgical History:   Procedure Laterality Date     MYRINGOTOMY W/ TUBES      As a child       Problem list:    Patient Active Problem List    Diagnosis Date Noted     Anal fistula 2024     Priority: Medium       Medications:  Current Outpatient Medications   Medication Sig Dispense Refill     clindamycin (CLEOCIN-T) 1 % external gel Apply topically 2 times daily (Patient not " "taking: Reported on 8/6/2024)       CONCERTA OR Take by mouth every morning       ibuprofen (ADVIL/MOTRIN) 200 MG tablet Take 200 mg by mouth every 4 hours as needed for pain (Patient not taking: Reported on 8/6/2024)       PARoxetine (PAXIL) 30 MG tablet Take 1 tablet by mouth every evening       polyethylene glycol (MIRALAX) 17 g packet Take 17 g by mouth daily       TRAZODONE HCL every evening         Allergies:  Allergies   Allergen Reactions     Sulfa Antibiotics Hives       Family history:  Family History   Problem Relation Age of Onset     Anesthesia Reaction No family hx of      Venous thrombosis No family hx of        Social history:  Social History     Tobacco Use     Smoking status: Never     Passive exposure: Yes     Smokeless tobacco: Never     Tobacco comments:     mom smokes   Substance Use Topics     Alcohol use: Never     Marital status: single.  Nursing Notes:   Neeur Fierro  9/24/2024  2:51 PM  Signed  Chief Complaint   Patient presents with     Post-op Visit     fistulotomy       Vitals:    09/24/24 1448   BP: 107/77   BP Location: Left arm   Patient Position: Sitting   Cuff Size: Adult Regular   Pulse: 100   SpO2: 99%   Weight: 175 lb 14.4 oz   Height: 5' 8\"       Body mass index is 26.75 kg/m .    CLEMENCIA Lay     10 minutes spent on the date of the encounter doing chart review, history and exam, documentation and further activities as noted above.   This is a postop visit.    SHAHLA Boyd, NP-C  Colon and Rectal Surgery  [facility]    This note was created using speech recognition software and may contain unintended word substitutions.      Again, thank you for allowing me to participate in the care of your patient.      Sincerely,    SHAHLA Boyd CNP    "

## 2024-09-24 NOTE — PROGRESS NOTES
"Colon and Rectal Surgery Postoperative Clinic Note    RE: Lit Maldonado  : 1995  PEPE: 2024    Lit Maldonado is a very pleasant 29 year old female with transsphincteric fistula who is now status post EUA fistulotomy on 24 by Dr. Cheng.    Interval history: Lit has been doing well. No pain. No drainage. No difficulty with bowel movements. No fevers or chills.    Physical Examination: Exam was chaperoned by Neeru Fierro EMT   /77 (BP Location: Left arm, Patient Position: Sitting, Cuff Size: Adult Regular)   Pulse 100   Ht 5' 8\"   Wt 175 lb 14.4 oz   SpO2 99%   BMI 26.75 kg/m    General: alert, oriented, in no acute distress, sitting comfortably  HEENT: mucous membranes moist    Perianal external examination:  Surgical site in the left anterior position completely healed    Digital rectal examination: Was deferred.    Anoscopy: Was deferred.    Assessment/Plan:  29 year old female status post EUA fistulotomy on 24 by Dr. Cheng. Surgical site healed. No difficulty with bowel movements. She can follow up as needed.    Medical history:  Past Medical History:   Diagnosis Date    ADHD (attention deficit hyperactivity disorder)     Anxiety        Surgical history:  Past Surgical History:   Procedure Laterality Date    MYRINGOTOMY W/ TUBES      As a child       Problem list:    Patient Active Problem List    Diagnosis Date Noted    Anal fistula 2024     Priority: Medium       Medications:  Current Outpatient Medications   Medication Sig Dispense Refill    clindamycin (CLEOCIN-T) 1 % external gel Apply topically 2 times daily (Patient not taking: Reported on 2024)      CONCERTA OR Take by mouth every morning      ibuprofen (ADVIL/MOTRIN) 200 MG tablet Take 200 mg by mouth every 4 hours as needed for pain (Patient not taking: Reported on 2024)      PARoxetine (PAXIL) 30 MG tablet Take 1 tablet by mouth every evening      polyethylene glycol (MIRALAX) 17 g packet Take 17 g " "by mouth daily      TRAZODONE HCL every evening         Allergies:  Allergies   Allergen Reactions    Sulfa Antibiotics Hives       Family history:  Family History   Problem Relation Age of Onset    Anesthesia Reaction No family hx of     Venous thrombosis No family hx of        Social history:  Social History     Tobacco Use    Smoking status: Never     Passive exposure: Yes    Smokeless tobacco: Never    Tobacco comments:     mom smokes   Substance Use Topics    Alcohol use: Never     Marital status: single.  Nursing Notes:   Neeru Fierro  9/24/2024  2:51 PM  Signed  Chief Complaint   Patient presents with    Post-op Visit     fistulotomy       Vitals:    09/24/24 1448   BP: 107/77   BP Location: Left arm   Patient Position: Sitting   Cuff Size: Adult Regular   Pulse: 100   SpO2: 99%   Weight: 175 lb 14.4 oz   Height: 5' 8\"       Body mass index is 26.75 kg/m .    CLEMENCIA Lay     10 minutes spent on the date of the encounter doing chart review, history and exam, documentation and further activities as noted above.   This is a postop visit.    SHAHLA Epps, NP-C  Colon and Rectal Surgery  [facility]    This note was created using speech recognition software and may contain unintended word substitutions.    "